# Patient Record
Sex: MALE | Race: WHITE | NOT HISPANIC OR LATINO | Employment: STUDENT | URBAN - METROPOLITAN AREA
[De-identification: names, ages, dates, MRNs, and addresses within clinical notes are randomized per-mention and may not be internally consistent; named-entity substitution may affect disease eponyms.]

---
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
## 2021-05-29 ENCOUNTER — OFFICE VISIT (OUTPATIENT)
Dept: URGENT CARE | Facility: URGENT CARE | Age: 21
End: 2021-05-29
Payer: COMMERCIAL

## 2021-05-29 VITALS
DIASTOLIC BLOOD PRESSURE: 69 MMHG | SYSTOLIC BLOOD PRESSURE: 129 MMHG | RESPIRATION RATE: 16 BRPM | HEART RATE: 74 BPM | TEMPERATURE: 97.7 F | OXYGEN SATURATION: 98 %

## 2021-05-29 DIAGNOSIS — H60.502 ACUTE OTITIS EXTERNA OF LEFT EAR, UNSPECIFIED TYPE: Primary | ICD-10-CM

## 2021-05-29 PROCEDURE — 99213 OFFICE O/P EST LOW 20 MIN: CPT | Performed by: PHYSICIAN ASSISTANT

## 2021-05-29 RX ORDER — NEOMYCIN SULFATE, POLYMYXIN B SULFATE, HYDROCORTISONE 3.5; 10000; 1 MG/ML; [USP'U]/ML; MG/ML
3 SOLUTION/ DROPS AURICULAR (OTIC) 4 TIMES DAILY
Qty: 5 ML | Refills: 0 | Status: SHIPPED | OUTPATIENT
Start: 2021-05-29 | End: 2021-06-05

## 2021-05-29 NOTE — PROGRESS NOTES
SUBJECTIVE:  Daniele Quinones is a 21 year old male who presents with left ear pain for 3 day(s).   Severity: moderate   Timing:gradual onset  Additional symptoms include none.      History of recurrent otitis: no    No past medical history on file.  Current Outpatient Medications   Medication Sig Dispense Refill     neomycin-polymyxin-hydrocortisone (CORTISPORIN) 3.5-12981-4 otic solution Place 3 drops Into the left ear 4 times daily for 7 days 5 mL 0     Social History     Tobacco Use     Smoking status: Never Smoker     Smokeless tobacco: Never Used   Substance Use Topics     Alcohol use: Not on file       ROS:   Review of systems negative except as stated above.    OBJECTIVE:  /69 (BP Location: Right arm, Cuff Size: Adult Regular)   Pulse 74   Temp 97.7  F (36.5  C) (Tympanic)   Resp 16   SpO2 98%    EXAM:  The right TM is normal: no effusions, no erythema, and normal landmarks     The right auditory canal is normal and without drainage, edema or erythema  The left TM is normal: no effusions, no erythema, and normal landmarks  The left auditory canal is swollen and tender  Oropharynx exam is normal: no lesions, erythema, adenopathy or exudate.  GENERAL: no acute distress  EYES: EOMI,  PERRL, conjunctiva clear  NECK: supple, non-tender to palpation, no adenopathy noted  RESP: lungs clear to auscultation - no rales, rhonchi or wheezes  CV: regular rates and rhythm, normal S1 S2, no murmur noted  SKIN: no suspicious lesions or rashes     ASSESSMENT:  (H60.502) Acute otitis externa of left ear, unspecified type  (primary encounter diagnosis)  Plan: neomycin-polymyxin-hydrocortisone (CORTISPORIN)        3.5-53577-0 otic solution      Patient Instructions     Patient Education     External Ear Infection (Adult)    External otitis (also called  swimmer s ear ) is an infection in the ear canal. It's often caused by bacteria or fungus. It can occur a few days after water gets trapped in the ear canal (from  swimming or bathing). It can also occur after cleaning too deeply in the ear canal with a cotton swab or other object. Sometimes, hair care products get into the ear canal and cause this problem.   Symptoms can include pain, fever, itching, redness, drainage, or swelling of the ear canal. Temporary hearing loss may also occur.   Home care    Don't try to clean the ear canal. This can push pus and bacteria deeper into the canal.    Use prescribed ear drops as directed. These help reduce swelling and fight the infection. If an ear wick was placed in the ear canal, apply drops right onto the end of the wick. The wick will draw the medicine into the ear canal even if it's swollen closed.    A cotton ball may be loosely placed in the outer ear to absorb any drainage.    You may use over-the-counter medicines to control pain as directed by the healthcare provider, unless another medicine was prescribed. Talk with your provider before using these medicines if you have chronic liver or kidney disease or ever had a stomach ulcer or digestive tract bleeding.    Don't allow water to get into your ear when bathing. Also don't swim until the infection has cleared.    Prevention    Keep your ears dry. This helps lower the risk of infection. Dry your ears with a towel or hair dryer after getting wet. Also, use ear plugs when swimming.    Don't stick any objects in the ear to remove wax.    Talk with your provider about using ear drops to prevent swimmer's ear in case you feel water trapped in your ear canal. You can get these drops over the counter at most drugstores. They work by removing water from the ear canal.    Follow-up care  Follow up with your healthcare provider in 1 week, or as advised.   When to seek medical advice  Call your healthcare provider right away if any of these occur:     Ear pain becomes worse or doesn t improve after 3 days of treatment    Redness or swelling of the outer ear occurs or gets  worse    Headache    Fever of 100.4 F (38 C) or higher, or as directed by your healthcare provider  Call 911  Call 911 or get immediate medical care if any of the following occur:     Seizure    Unusual drowsiness or confusion    Unusual painful or stiff neck    Beto last reviewed this educational content on 8/1/2020 2000-2021 [employer]. All rights reserved. This information is not intended as a substitute for professional medical care. Always follow your healthcare professional's instructions.

## 2021-05-29 NOTE — PATIENT INSTRUCTIONS
Patient Education     External Ear Infection (Adult)    External otitis (also called  swimmer s ear ) is an infection in the ear canal. It's often caused by bacteria or fungus. It can occur a few days after water gets trapped in the ear canal (from swimming or bathing). It can also occur after cleaning too deeply in the ear canal with a cotton swab or other object. Sometimes, hair care products get into the ear canal and cause this problem.   Symptoms can include pain, fever, itching, redness, drainage, or swelling of the ear canal. Temporary hearing loss may also occur.   Home care    Don't try to clean the ear canal. This can push pus and bacteria deeper into the canal.    Use prescribed ear drops as directed. These help reduce swelling and fight the infection. If an ear wick was placed in the ear canal, apply drops right onto the end of the wick. The wick will draw the medicine into the ear canal even if it's swollen closed.    A cotton ball may be loosely placed in the outer ear to absorb any drainage.    You may use over-the-counter medicines to control pain as directed by the healthcare provider, unless another medicine was prescribed. Talk with your provider before using these medicines if you have chronic liver or kidney disease or ever had a stomach ulcer or digestive tract bleeding.    Don't allow water to get into your ear when bathing. Also don't swim until the infection has cleared.    Prevention    Keep your ears dry. This helps lower the risk of infection. Dry your ears with a towel or hair dryer after getting wet. Also, use ear plugs when swimming.    Don't stick any objects in the ear to remove wax.    Talk with your provider about using ear drops to prevent swimmer's ear in case you feel water trapped in your ear canal. You can get these drops over the counter at most drugstores. They work by removing water from the ear canal.    Follow-up care  Follow up with your healthcare provider in 1 week,  or as advised.   When to seek medical advice  Call your healthcare provider right away if any of these occur:     Ear pain becomes worse or doesn t improve after 3 days of treatment    Redness or swelling of the outer ear occurs or gets worse    Headache    Fever of 100.4 F (38 C) or higher, or as directed by your healthcare provider  Call 911  Call 911 or get immediate medical care if any of the following occur:     Seizure    Unusual drowsiness or confusion    Unusual painful or stiff neck    Beto last reviewed this educational content on 8/1/2020 2000-2021 [employer]. All rights reserved. This information is not intended as a substitute for professional medical care. Always follow your healthcare professional's instructions.

## 2024-05-30 ENCOUNTER — HOSPITAL ENCOUNTER (EMERGENCY)
Facility: CLINIC | Age: 24
Discharge: HOME | End: 2024-05-30
Attending: EMERGENCY MEDICINE | Admitting: EMERGENCY MEDICINE
Payer: COMMERCIAL

## 2024-05-30 VITALS
HEIGHT: 68 IN | RESPIRATION RATE: 17 BRPM | HEART RATE: 54 BPM | BODY MASS INDEX: 25.73 KG/M2 | TEMPERATURE: 98.8 F | WEIGHT: 169.8 LBS | OXYGEN SATURATION: 98 % | SYSTOLIC BLOOD PRESSURE: 142 MMHG | DIASTOLIC BLOOD PRESSURE: 92 MMHG

## 2024-05-30 DIAGNOSIS — R45.851 SUICIDAL IDEATION: ICD-10-CM

## 2024-05-30 DIAGNOSIS — F33.1 MAJOR DEPRESSIVE DISORDER, RECURRENT EPISODE, MODERATE (H): Primary | ICD-10-CM

## 2024-05-30 PROBLEM — F43.21 ADJUSTMENT DISORDER WITH DEPRESSED MOOD: Status: ACTIVE | Noted: 2024-05-30

## 2024-05-30 PROCEDURE — 250N000013 HC RX MED GY IP 250 OP 250 PS 637: Performed by: NURSE PRACTITIONER

## 2024-05-30 PROCEDURE — 99213 OFFICE O/P EST LOW 20 MIN: CPT | Performed by: NURSE PRACTITIONER

## 2024-05-30 PROCEDURE — 99283 EMERGENCY DEPT VISIT LOW MDM: CPT

## 2024-05-30 RX ORDER — ESCITALOPRAM OXALATE 5 MG/1
5 TABLET ORAL DAILY
COMMUNITY
Start: 2024-05-16

## 2024-05-30 RX ORDER — ESCITALOPRAM OXALATE 5 MG/1
5 TABLET ORAL DAILY
Status: DISCONTINUED | OUTPATIENT
Start: 2024-05-30 | End: 2024-05-30 | Stop reason: HOSPADM

## 2024-05-30 RX ADMIN — ESCITALOPRAM OXALATE 5 MG: 5 TABLET, FILM COATED ORAL at 19:44

## 2024-05-30 ASSESSMENT — ACTIVITIES OF DAILY LIVING (ADL)
ADLS_ACUITY_SCORE: 35

## 2024-05-30 ASSESSMENT — COLUMBIA-SUICIDE SEVERITY RATING SCALE - C-SSRS
6. HAVE YOU EVER DONE ANYTHING, STARTED TO DO ANYTHING, OR PREPARED TO DO ANYTHING TO END YOUR LIFE?: NO
2. HAVE YOU ACTUALLY HAD ANY THOUGHTS OF KILLING YOURSELF IN THE PAST MONTH?: YES
1. IN THE PAST MONTH, HAVE YOU WISHED YOU WERE DEAD OR WISHED YOU COULD GO TO SLEEP AND NOT WAKE UP?: YES
3. HAVE YOU BEEN THINKING ABOUT HOW YOU MIGHT KILL YOURSELF?: NO
4. HAVE YOU HAD THESE THOUGHTS AND HAD SOME INTENTION OF ACTING ON THEM?: YES
5. HAVE YOU STARTED TO WORK OUT OR WORKED OUT THE DETAILS OF HOW TO KILL YOURSELF? DO YOU INTEND TO CARRY OUT THIS PLAN?: NO

## 2024-05-30 NOTE — PROGRESS NOTES
Batsheva is 24 year old male with a history of depression, anxiety, and ADHD. He was received from ED due to suicidal ideation. Reports indicate that he has been having suicidal ideation for the last 2 months since being fired from his job where he worked as . Patient reports SI with no plan. Pt stressors have been that he had interviewed for a new job after his third round of interview he got a letter of rejection, that triggered his SI. Pt mentioned that he has struggled with depression since when he was about 14 years old to 15 years old, but never sought treatment for it. He mentioned that he used to take concerta for ADHD but he has stopped the medication., he didn't like the side effect of it.    Nursing and risk assessments completed. Assessments reviewed with LMHP and physician. Admission information reviewed with patient. Patient given a tour of [facility] and instructions on using the facility. Questions regarding [facility] addressed. Pt safety search completed.

## 2024-05-30 NOTE — ED TRIAGE NOTES
Pt comes in for suicidal thoughts that have been going on for the last 2 months since being fired from his job. No plan. No suicidal history. Started lexapro 2 weeks ago. Has not been sleeping well. Pt is calm and cooperative.

## 2024-05-30 NOTE — ED PROVIDER NOTES
"    History     Chief Complaint:  Suicidal       HPI   Daniele Quinones is a 24 year old male presenting with his mother with suicidal ideation.  He reports he has been having mild suicidal thoughts for approximately 1 month, but they have significantly worsened starting today.  He states he was recently fired from his job, and he was interviewing for another one, and just found out that he did not get the new job today.  He has no active plan, but states he wants to die \"by my own hands.\"  He started Lexapro 2 weeks ago, but he otherwise takes no medications.  No alcohol use.  He endorses recreational marijuana use.      Independent Historian:    Patient only    Review of External Notes:  11/16/23: Clinic note reviewed      Medications:    No current outpatient medications on file.      Past Medical History:    No past medical history on file.    Past Surgical History:    No past surgical history on file.       Physical Exam   Patient Vitals for the past 24 hrs:   BP Temp Temp src Pulse Resp SpO2   05/30/24 1537 119/70 97.7  F (36.5  C) Temporal 56 16 97 %        Physical Exam  General: No acute distress.  Head: No obvious trauma to head.  Eyes:  Conjunctivae clear.   Neck: Normal range of motion.   CV: Skin is well perfused, no cyanosis  Respiratory: Effort normal. No audible wheezing.  Gastrointestinal: Non-distended.  Musculoskeletal: Normal range of motion. No gross deformities.  Neuro: Alert. Moving all extremities appropriately.  Normal speech.    Skin: No rashes or lesions on exposed skin.  Psych: Suicidal ideation      Emergency Department Course       Laboratory:  Labs Ordered and Resulted from Time of ED Arrival to Time of ED Departure - No data to display     Procedures   NA    Emergency Department Course & Assessments:    Interventions:  Medications - No data to display     Assessments:  1542 initial evaluation and assessment of patient    Independent Interpretation (X-rays, CTs, rhythm " strip):  None    Consultations/Discussion of Management or Tests:  None       Social Determinants of Health affecting care:  None     Disposition:  The patient was transferred to [facility]    Impression & Plan    CMS Diagnoses: None       Medical Decision Making:  Patient presents with his mother with suicidal ideation.  He has no specific plan.  He has not previously attempted suicide or self-harm.  He is agreeable to coming to the [facility] unit for help.  He remains calm and cooperative.  He is transported to the [facility] unit in stable condition.        Diagnosis:    ICD-10-CM    1. Suicidal ideation  R45.851            Discharge Medications:  New Prescriptions    No medications on file          5/30/2024   Brandon Ashley MD Peery, Stephen, MD  05/30/24 0692

## 2024-05-30 NOTE — CONSULTS
"Diagnostic Evaluation Consultation  Crisis Assessment    Patient Name: Daniele Quinones  Age:  24 year old  Legal Sex: male  Gender Identity: male  Pronouns:   Race: White  Ethnicity: Not  or   Language: English      Patient was assessed: In person      Patient location: [facility] EMERGENCY DEPT EMP 5                                 Referral Data and Chief Complaint  Daniele Quinones presents to the ED with family/friends. Patient is presenting to the ED for the following concerns: Suicidal ideation, Depression.   Factors that make the mental health crisis life threatening or complex are:  Patient presented to the emergency department with his mother, at the recommendation of this therapist, due to increased depression and suicidal ideation. After pt had a third and final interview with a prospective employer, pt received a rejection letter this morning. He called his mother, stating that he felt worthless and suicidal. He told his mother that he wanted to end his life by his \"own hands.\" He had a therapy appointment today, which his mother accompanied him to. His therapist recommended that pt be evaluated in the ED. At the time of assessment, pt presented as calm and cooperative. He denied having current suicidal ideation. He reported feeling suicidal earlier today with various methods including hanging. He denied making a plan and having intent to act on these thoughts..      Informed Consent and Assessment Methods  Explained the crisis assessment process, including applicable information disclosures and limits to confidentiality, assessed understanding of the process, and obtained consent to proceed with the assessment.  Assessment methods included conducting a formal interview with patient, review of medical records, collaboration with medical staff, and obtaining relevant collateral information from family and community providers when available.  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 He held that job for nine months even though he did not enjoy it. He was not in therapy during that time, but he has restarted weekly therapy since termination. Pt reported a history of ADHD since he was [age], which he was on Concerta for for about ten years. Pt reported struggling with depression and has had suicidal ideations since he was 13 or 14 years old. He stated that these thoughts are usually fleeting. He denied a history of suicide attempts and self-harm. Pt met with a new provider, a physician assistant, two weeks ago and was started on Lexapro. Pt stated that he was aware of the side effect of increased SI, but he would like to give it a change and to continue taking it.    Brief Psychosocial History  Family:   (Pt has a girlfriend, who is in [place] for college), Children no  Support System:  Partner, Parent(s)  Employment Status:  unemployed, employment seeking  Source of Income:  none, other (see comments) (supported by family)  Financial Environmental Concerns:  unemployed  Current Hobbies:  exercise/fitness  Barriers in Personal Life:  mental health concerns    Significant Clinical History  Current Anxiety Symptoms:  excessive worry (poor sleep)  Current Depression/Trauma:  excessive guilt, thoughts of death/suicide, low self esteem, impaired decision making, hopelessness, helplessness, sadness, crying or feels like crying, negativistic, difficulty concentrating, withdrawl/isolation, apathy  Current Somatic Symptoms:     Current Psychosis/Thought Disturbance:     Current Eating Symptoms:  loss of appetite  Chemical Use History:  Alcohol: None  Last Use:: 05/25/24  Benzodiazepines: None  Opiates: None  Cocaine: None  Marijuana: Daily  Last  Use:: 05/29/24  Other Use: None   Past diagnosis:  ADHD  Family history:  Depression (mother with depression)  Past treatment:     Details of most recent treatment:  Pt met with a new provider, a physician assistant, two weeks ago and was started on Lexapro. Pt has an established weekly OP therapist.  Other relevant history:          Collateral Information  Is there collateral information: Yes     Collateral information name, relationship, phone number:  Freida Stahl (Mother) [phone]    What happened today: Patient lives with dad, but called mom after he was denied a job offer. He expressed suicidal ideation, so mom rushed to be with pt. She went with him to the therapy appointment, but did not sit in with pt until the therapist called her in. She brought pt to the ED unit.     What is different about patient's functioning: Freida stated that pt chronically feels bad about himself and struggles with shame. She stated that how others see pt versus how he views himself is the complete opposite,       Has patient made comments about wanting to kill themselves/others: yes    If d/c is recommended, can they take part in safety/aftercare planning: yes    Additional collateral information:  Freida stated that pt quickly found a recommendation after his therapist recommended it. Pt saw a PA for the first time two weeks ago. Freida would like him to see a psychiatrist. 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     Risk Assessment  [place] Suicide Severity Rating Scale Full Clinical Version:  Suicidal Ideation  Q1 Wish to be Dead (Lifetime): Yes  Q2 Non-Specific Active Suicidal Thoughts (Lifetime): Yes  3. Active Suicidal Ideation with any Methods (Not Plan) Without Intent to Act (Lifetime): Yes  Q4 Active Suicidal Ideation with Some Intent to Act, Without Specific Plan (Lifetime): No  Q5 Active Suicidal Ideation with Specific Plan and Intent (Lifetime): No  Q6  Suicide Behavior (Lifetime): no     Suicidal Behavior (Lifetime)  Actual Attempt (Lifetime): No  Has subject engaged in non-suicidal self-injurious behavior? (Lifetime): No  Interrupted Attempts (Lifetime): No  Aborted or Self-Interrupted Attempt (Lifetime): No  Preparatory Acts or Behavior (Lifetime): No    [place] Suicide Severity Rating Scale Recent:   Suicidal Ideation (Recent)  Q1 Wished to be Dead (Past Month): yes  Q2 Suicidal Thoughts (Past Month): yes  Q3 Suicidal Thought Method: yes  Q4 Suicidal Intent without Specific Plan: no  Q5 Suicide Intent with Specific Plan: no  Level of Risk per Screen: moderate risk  Intensity of Ideation (Recent)  Most Severe Ideation Rating (Past 1 Month): 3  Description of Most Severe Ideation (Past 1 Month): Pt reported feeling suicidal earlier today with various methods including hanging. He denied making a plan and having intent to act on these thoughts.  Frequency (Past 1 Month): Less than once a week  Duration (Past 1 Month): Fleeting, few seconds or minutes  Controllability (Past 1 Month): Can control thoughts with little difficulty  Deterrents (Past 1 Month): Deterrents definitely stopped you from attempting suicide  Reasons for Ideation (Past 1 Month): Mostly to end or stop the pain (You couldn't go on living with the pain or how you were feeling)  Suicidal Behavior (Recent)  Actual Attempt (Past 3 Months): No  Has subject engaged in non-suicidal self-injurious behavior? (Past 3 Months): No  Interrupted Attempts (Past 3 Months): No  Aborted or Self-Interrupted Attempt (Past 3 Months): No  Preparatory Acts or Behavior (Past 3 Months): No    Environmental or Psychosocial Events: neither working nor attending school, work or task failure, geographic isolation from supports, unemployment/underemployment, recent life events (see comment) (job rejection today)  Protective Factors: Protective Factors: strong bond to family unit, community support, or employment,  responsibilities and duties to others, including pets and children, lives in a responsibly safe and stable environment, good treatment engagement, sense of importance of health and wellness, able to access care without barriers, good impulse control, help seeking, supportive ongoing medical and mental health care relationships, reality testing ability, sense of self-efficacy and/or positive self-esteem, optimistic outlook - identification of future goals    Does the patient have thoughts of harming others? Feels Like Hurting Others: no  Previous Attempt to Hurt Others: no  Is the patient engaging in sexually inappropriate behavior?: no    Is the patient engaging in sexually inappropriate behavior?  no        Mental Status Exam   Affect: Appropriate  Appearance: Appropriate  Attention Span/Concentration: Attentive  Eye Contact: Engaged    Fund of Knowledge: Appropriate   Language /Speech Content: Fluent  Language /Speech Volume: Normal  Language /Speech Rate/Productions: Normal  Recent Memory: Intact  Remote Memory: Intact  Mood: Normal, Sad  Orientation to Person: Yes   Orientation to Place: Yes  Orientation to Time of Day: Yes  Orientation to Date: Yes     Situation (Do they understand why they are here?): Yes  Psychomotor Behavior: Normal  Thought Content: Clear  Thought Form: Intact       Medication  Psychotropic medications:   Medication Orders - Psychiatric (From admission, onward)      Start     Dose/Rate Route Frequency Ordered Stop    05/30/24 1932  escitalopram (LEXAPRO) tablet 5 mg         5 mg Oral DAILY 05/30/24 1932               Current Care Team  Patient Care Team:  No Ref-Primary, Physician as PCP - Micaela Major, RN as Nurse Coordinator (Developmental[facility]Behavioral Pediatrics)    Diagnosis  Patient Active Problem List   Diagnosis Code    Attention deficit hyperactivity disorder (ADHD) F90.9    Adjustment disorder with depressed mood F43.21       Primary Problem This Admission  Active  Hospital Problems    *Adjustment disorder with depressed mood F43.21      Clinical Summary and Substantiation of Recommendations   Patient presented to the emergency department with his mother, at the recommendation of this therapist, due to increased depression and suicidal ideation. After pt had a third and final interview with a prospective employer, pt received a rejection letter this morning. Pt called his mother expressing shame, guilt, worthlessness, and expressed suicidal ideation to her. At the time of assessment, pt reported feeling suicidal earlier today with various methods including hanging. He denied making a plan and having intent to act on these thoughts. He denied current suicidal ideation. He was cooperative with assessment and responded well to cognitive behavioral techniques such as role playing and cognitive restructuring/reframing. After a period of observation at [facility], pt reported a decrease in depressive and anxiety symptoms.                 Pt met with a new provider, a physician assistant, two weeks ago and was started on Lexapro. Pt stated that he was aware of the side effect of increased SI, but he would like to give it a change and to continue taking it. He plans to call the clinic for a follow up appointment. Pt plans to follow up with his established outpatient therapist. Pt was interested in programmatic care including IOP/PHP, so a [facility] diagnostic assessment was scheduled for 6/7/24 on his behalf.          Patient coping skills attempted to reduce the crisis:  Talking to mom and therapist    Disposition  Recommended disposition: Individual Therapy, Programmatic Care, Medication Management        Reviewed case and recommendations with attending provider. Attending Name: Taisha Edouard       Attending concurs with disposition: yes       Patient and/or validated legal guardian concurs with disposition: yes       Final disposition:  discharge    Legal status on admission:  Voluntary/Patient has signed consent for treatment    Assessment Details   Total duration spent with the patient: 60 min     CPT code(s) utilized: 64529 - Psychotherapy for Crisis - 60 (30-74*) min    DENILSON Martino, Psychotherapist  DEC - Triage & Transition Services  Callback: [phone]

## 2024-05-30 NOTE — ED NOTES
[facility]  ED to EMPATH Checklist:      Goal for EMPATH: Suicidality    Current Behavior: Cooperative    Safety Concerns: Suicidal, no plan    Legal Hold Status: Voluntary    Medically Cleared by ED provider: Yes    Patient Therapeutically Searched: Not searched - Currently in triage    Belongings: Remain with patient    Independent Ambulation at Baseline: Yes/No: Yes    Participates in Care/Conversation: Yes/No: Yes    Patient Informed about EMPATH: Yes/No: Yes    DEC: Not ordered    Patient Ready to be Transferred to EMPATH? Yes/No: Yes

## 2024-05-31 NOTE — DISCHARGE INSTRUCTIONS
Aftercare Plan        Follow up with established providers and supports as scheduled. Continue taking medications as prescribed. Abstain from drugs and alcohol. Utilize your [facility] mental [facility] crisis team as needed. They are available 24/7. Contact information is listed below.     The following appointment(s) and/or referral(s) were made on your behalf. If you need to make changes or cancel please contact the clinic/provider directly.     What: Diagnostic Assessment for Programmatic Care  When: June 7th 7:30 am  Who: Quinn PEÑA  Where: [facility]  [address] [address]  Contact: [phone]         If I am feeling unsafe or I am in a crisis, I will:   Contact my established care providers   Call the National Suicide Prevention Lifeline: 194.755.4248   Go to the nearest emergency room   Call 141     Warning signs that I or other people might notice when a crisis is developing for me: changes to sleep, appetite or mood, increased anger, agitation or irritability, feeling depressed or hopeless, spending more time alone or talking less, increased crying, decreased productivity, seeing or hearing things that aren't there, thoughts of not wanting to live anymore or of actually killing myself, thoughts of hurting others    Things I am able to do on my own to cope or help me feel better: watching a favorite tv show or movie, listening to music I enjoy, going outside and breathing fresh air, going for a walk or exercising, taking a shower or bath, a cold or hot beverage, a healthy snack, drawing/coloring/painting, journaling, singing or dancing, deep breathing     I can try practicing square breathing when I begin to feel anxious - inhale through the nose for the count of 4 and the first line on the square. Exhale through the mouth for the count of 4 for the second line of the square. Repeat to complete the square. Repeat the square as many times as  needed.    I can also use my five senses to practice mindfulness and grounding. What are five things I can see, four things I can hear, three things I can feel, two things I can smell, and one thing I can taste.     Things that I am able to do with others to cope or help me feel better: sometimes just talking or spending time with someone else, sharing a meal or having coffee, watching a movie or playing a game, going for a walk or exercising    I can also use community resources including mental health hotlines, [facility] crisis teams, or apps.     Things I can use or do for distraction: movies/tv, music, reading, games, drawing/coloring/painting or other art, essential oils, exercise, cleaning/organizing, puzzles, crossword puzzles, word search, Sudoku       I can also download a meditation or relaxation fco, like Calm, Headspace, or Insight Timer (all three offer a free version)    Changes I can make to support my mental health and wellness: Attend scheduled mental health therapy and psychiatric appointments. Take my medications as prescribed. Maintain a daily schedule/routine. Abstain from all mood altering substances, including drugs, alcohol, or medications not currently prescribed to me. Implement a self-care routine.      People in my life that I can ask for help: friends or family, trusted teachers/staff/colleagues, trusted members of my community or place of [employer], mental health crisis lines, or 911    Your [facility] has a mental health crisis team you can call 24/7: [facility] Adult, [phone]    Other things that are important when I m in crisis: to remember that the feelings I am having right now are temporary, and it won't feel like this forever, and that it is okay and important to ask for help    Crisis Lines  Crisis Text Line  Text 440990  You will be connected with a trained live crisis counselor to provide support.      National Hope Line  1.800.SUICIDE [7084928]      Community  "Resources  Fast Tracker  Linking people to mental health and substance use disorder resources  fasttrackermn.org     [place] Mental Health Warm Line  Peer to peer support  Monday thru Saturday, 12 pm to 10 pm  497.843.7576 or 9.010.835.9680  Text \"Support\" to 02945    National [place] on Mental Illness (ELIZ)  721.812.8158 or 1.888.ELIZ.HELPS      Mental Health Apps  My3  https://Otogami.org/    VirtualHopeBox  https://[employer]/apps/virtual-hope-box/      Additional Information  Today you were seen by a licensed mental health professional through Triage and Transition services, [facility] ([facility])  for a crisis assessment in the Emergency Department at [facility].  It is recommended that you follow up with your established providers (psychiatrist, mental health therapist, and/or primary care doctor - as relevant) as soon as possible. Coordinators from [facility] will be calling you in the next 24-48 hours to ensure that you have the resources you need.  You can also contact [facility] coordinators directly at [phone]. You may have been scheduled for or offered an appointment with a mental health provider. [facility] maintains an extensive network of licensed behavioral health providers to connect patients with the services they need.  We do not charge providers a fee to participate in our referral network.  We match patients with providers based on a patient's specific needs, insurance coverage, and location.  Our first effort will be to refer you to a provider within your care system, and will utilize providers outside your care system as needed.    "

## 2024-05-31 NOTE — PROGRESS NOTES
Discharge instructions reviewed with patient including follow-up care plan. Educated on medication regimen and advised not to stop prescribed medication without consulting their physician. Reviewed safety plan and outpatient resources. Pt denies active SI. Pt has contracted for safety and completed safety plan with [facility].  All belongings which were brought into the hospital have been returned to patient. Escorted off the unit at door4 accompanied by staff RN.  Discharged to home in stable condition via mum's car.

## 2024-06-07 ENCOUNTER — TELEPHONE (OUTPATIENT)
Dept: BEHAVIORAL HEALTH | Facility: CLINIC | Age: 24
End: 2024-06-07
Payer: COMMERCIAL

## 2024-06-07 ENCOUNTER — HOSPITAL ENCOUNTER (OUTPATIENT)
Dept: BEHAVIORAL HEALTH | Facility: CLINIC | Age: 24
Discharge: HOME | End: 2024-06-07
Attending: EMERGENCY MEDICINE | Admitting: EMERGENCY MEDICINE
Payer: COMMERCIAL

## 2024-06-07 DIAGNOSIS — F43.23 ADJUSTMENT DISORDER WITH MIXED ANXIETY AND DEPRESSED MOOD: Primary | ICD-10-CM

## 2024-06-07 PROCEDURE — 90791 PSYCH DIAGNOSTIC EVALUATION: CPT | Performed by: COUNSELOR

## 2024-06-07 RX ORDER — ESCITALOPRAM OXALATE 10 MG/1
10 TABLET ORAL DAILY
COMMUNITY

## 2024-06-07 ASSESSMENT — PATIENT HEALTH QUESTIONNAIRE - PHQ9: SUM OF ALL RESPONSES TO PHQ QUESTIONS 1-9: 10

## 2024-06-07 ASSESSMENT — ANXIETY QUESTIONNAIRES
GAD7 TOTAL SCORE: 6
IF YOU CHECKED OFF ANY PROBLEMS ON THIS QUESTIONNAIRE, HOW DIFFICULT HAVE THESE PROBLEMS MADE IT FOR YOU TO DO YOUR WORK, TAKE CARE OF THINGS AT HOME, OR GET ALONG WITH OTHER PEOPLE: NOT DIFFICULT AT ALL
4. TROUBLE RELAXING: SEVERAL DAYS
3. WORRYING TOO MUCH ABOUT DIFFERENT THINGS: SEVERAL DAYS
6. BECOMING EASILY ANNOYED OR IRRITABLE: MORE THAN HALF THE DAYS
2. NOT BEING ABLE TO STOP OR CONTROL WORRYING: NOT AT ALL
1. FEELING NERVOUS, ANXIOUS, OR ON EDGE: MORE THAN HALF THE DAYS
GAD7 TOTAL SCORE: 6
5. BEING SO RESTLESS THAT IT IS HARD TO SIT STILL: NOT AT ALL
7. FEELING AFRAID AS IF SOMETHING AWFUL MIGHT HAPPEN: NOT AT ALL

## 2024-06-07 ASSESSMENT — COLUMBIA-SUICIDE SEVERITY RATING SCALE - C-SSRS
4. HAVE YOU HAD THESE THOUGHTS AND HAD SOME INTENTION OF ACTING ON THEM?: NO
6. HAVE YOU EVER DONE ANYTHING, STARTED TO DO ANYTHING, OR PREPARED TO DO ANYTHING TO END YOUR LIFE?: NO
3. HAVE YOU BEEN THINKING ABOUT HOW YOU MIGHT KILL YOURSELF?: YES
1. IN THE PAST MONTH, HAVE YOU WISHED YOU WERE DEAD OR WISHED YOU COULD GO TO SLEEP AND NOT WAKE UP?: YES
2. HAVE YOU ACTUALLY HAD ANY THOUGHTS OF KILLING YOURSELF IN THE PAST MONTH?: YES
5. HAVE YOU STARTED TO WORK OUT OR WORKED OUT THE DETAILS OF HOW TO KILL YOURSELF? DO YOU INTEND TO CARRY OUT THIS PLAN?: NO

## 2024-06-07 ASSESSMENT — PAIN SCALES - GENERAL: PAINLEVEL: NO PAIN (0)

## 2024-06-07 NOTE — TELEPHONE ENCOUNTER
Summary of Patient Care Communication Handoff to Patient Navigator Coordinator    PATIENT'S NAME: Daniele Quinones  MRN:   [id number]  :   2000    DATE OF SERVICE: 24    Referral Needed: Yes    Is the patient coming from an inpatient unit? No    What program is this referral for? Adult Mental Health Referral    Level of Care Recommended:  Neuro-Psych Testing  and Combined Intensive Outpatient Day Treatment Program (IOP-ADT) / Adult Day Treatment Program (ADT)    Specialty Track Recommendations:  MH Specialty Tracks: Young Adult     Schedule Preferences: Schedule Preference:  No schedule preference (Mornings or Afternoons)     Are there any potential barriers for entrance into programmatic care? NA     Followed up from  Needed?:  Yes: 9035 for ADHD testing     Mental Health Referral Needed: No     Release of Information Needed:  MELITA Cunha Needed: No  Follow up Requests:  Patient Navigator Coordinator Follow-Up Needed: Patient is undecided, Patient Navigator Coordinator [phone] was provided to the patient to call when decided. and Specialty Care Coordinator referral needed for:  9035 for ADHD testing     Comments: MELITA Kelley, [facility], [facility]        Patient Navigator Coordinator Contact Information  Pool Message: [email or web address] ([phone])   Phone:  [phone]  Fax:  [phone]  Email:  [email or web address]

## 2024-06-07 NOTE — PROGRESS NOTES
"Outpatient Mental Health Services - Adult    MY COPING PLAN FOR SAFETY    PATIENT'S NAME: Daniele Quinones  MRN:   [id number]    SAFETY PLAN:    Step 1: Warning signs / cues (Thoughts, images, mood, situation, behavior) that a crisis may be developing:    Thoughts: \"I don't matter\", \"People would be better off without me\", \"I'm a burden\", \"I can't do this anymore\", \"I just want this to end\", and \"Nothing makes it better\"  Images: obsessive thoughts of death or dying: suicidal ideations  Thinking Processes: ruminations (can't stop thinking about my problems): of the past  and highly critical and negative thoughts: of self  Mood: worsening depression, hopelessness, helplessness, and intense worry  Behaviors: isolating/withdrawing , using drugs, can't stop crying, not taking care of my responsibilities, and not sleeping enough  Situations: public shame: due to social anxiety and changes in symptoms: depression and anxiety      Step 2: Coping strategies - Things I can do to take my mind off of my problems without contacting another person (relaxation technique, physical activity):    Distress Tolerance Strategies:  listen to positive and upbeat music:  , watch a funny movie:  , paced breathing/progressive muscle relaxation, and intense exercise:   for 2-3 minutes   Physical Activities: go for a walk, exercise:  , meditation, and deep breathing  Focus on helpful thoughts:  \"This is temporary\", \"I will get through this\", \"It always passes\", and \"Ride the wave\"    Step 3: People and social settings that provide distraction:     Name: parents, partner, and friends  Phone: NA   Name: MELITA Phone: NA   community center and gym      Step 4: Remind myself of people and things that are important to me and worth living for:  \"being a son and being a partner, exercise/fitness, pets and outdoors\"    Step 5: When I am in crisis, I can ask these people to help me use my safety plan:     Name: parents and partner Phone: NA   Name: " NA Phone: NA    Step 6: Making the environment safe:     be around others    Step 7: Professionals or agencies I can contact during a crisis:    Suicide Prevention Lifeline: 1-060-730-CMZE (9645)  Crisis Text Line Service (available 24 hours a day, 7 days a week): Text [place] to 697956  Call  **CRISIS (879922) from a cell phone to talk to a team of professionals who can help you.    Crisis Services By [facility]: Phone Number:   [place]     [phone]   [place]    [phone]   [place]    [phone]   [place]    [phone]   [place]    [phone]   [place] [phone]   [place]     [phone]     Call 911 or go to my nearest emergency department.     I helped develop this safety plan and agree to use it when needed.  I have been given a copy of this plan.      Client signature _________________________________________________________________  Today s date:  6/7/2024  Adapted from Safety Plan Template 2008 Rosalina Serna and Zbigniew Barber is reprinted with the express permission of the authors.  No portion of the Safety Plan Template may be reproduced without the express, written permission.  You can contact the authors at [email or web address] or [email or web address]

## 2024-06-07 NOTE — PROGRESS NOTES
LOCUS Worksheet     Name: Daniele Quinones MRN: [id number]    : 2000      Gender:  female    PMI:  NA   Provider Name: [facility]   Provider NPI:  [id number]    Actual level of Care Provided:  therapy and psychiatry    Service(s) receiving or referred to:  [facility]    Reason for Variance: due to worsening mental health symptoms and suicidal ideations.      Rating completed by: Quinn Kelley [email or web address]      I. Risk of Harm:   3      Moderate Risk of Harm    II. Functional Status:   2      Mild Impairment    III. Co-Morbidity:   2      Minor Co-Morbidity    IV - A. Recovery Environment - Level of Stress:   3      Moderately Stress Environment    IV - B. Recovery Environment - Level of Support:   3      Limited Support in Environment    V. Treatment and Recovery History:   3      Moderate to Equivocal Response to Treatment and Recovery Management    VI. Engagement and Recovery Project:   2      Positive Engagement and Recovery       18 Composite Score    Level of Care Recommendation:   17 to 19       High Intensity Community Based Services

## 2024-06-07 NOTE — PROGRESS NOTES
"    [facility]        PATIENT'S NAME: Daniele Quinones  PREFERRED NAME: Daniele  PRONOUNS:       MRN: [id number]  : 2000  ADDRESS: [address] [place] [address]  ACCT. NUMBER:  [id number]  DATE OF SERVICE: 24  START TIME: 7:56 AM  END TIME: 9:32 AM  PREFERRED PHONE: [phone]  May we leave a program related message: Yes  EMERGENCY CONTACT: was obtained for Freida Stahl (Mother) [phone].  SERVICE MODALITY:  In-person    [facility] ADULT Mental Health DIAGNOSTIC ASSESSMENT    Identifying Information:  Patient is a 24 year old,    individual.  Patient was referred for an assessment by [facility] EMERGENCY DEPT EMP 5 .  Patient attended the session alone.    Chief Complaint:   The reason for seeking services at this time is: \" Suicidal ideation, Depression.  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 He was not in therapy during that time, but he has restarted weekly therapy since termination. Pt reported a history of ADHD since he was [age], which he was on Concerta for about ten years. Pt reported struggling with depression and has had suicidal ideations since he was 13 or 14 years ago. He denied a history of suicide attempts and self-harm. Pt chronically feels bad about himself and struggles with shame. She stated that how others see pt versus how he views himself is the complete opposite. He reported feeling suicidal earlier today with various methods including hanging. He denied making a plan and having intent to act on these thoughts. 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 \"   The problem(s) began a decade ago, just recently taken steps to address his mental health. Patient has attempted to " "resolve these concerns in the past through therapy and psychiatry .       Social/Family History:  Patient reported that he grew up in [place], [place].  He was raised by biological parents.  Parents  16 years ago when the patient was 8 years old. The patient mother did remarry few years ago The patient's father did not remarry and remains single.   Patient reported that his childhood was really good, supportive and loving.  Patient described his current relationships with family of origin as really good, super supportive, open communication with him.      The patient describes his cultural background as .  Cultural influences and impact on patient's life structure, values, norms, and healthcare:  none identified .  Contextual influences on patient's health include: Contextual Factors: Individual Factors MI and Family Factors MI .  Cultural, Contextual, and socioeconomic factors do not affect the patient's access to services.  These factors will be addressed in the Preliminary Treatment plan.  Patient identified their preferred language to be English. Patient reported that he does not  need the assistance of an  or other support involved in therapy.     Patient reported had no significant delays in developmental tasks.   Patient's highest education level was college graduate. Patient identified the following learning problems:  ADHD . Pt reported a history of ADHD since he was [age], which he was on Concerta for about ten years.  Modifications will be used to assist communication in therapy.  Patient reports that he is able to understand written materials.    Patient reported the following relationship history \"NA\".  Patient's current relationship status is in a relationship  for almost 7 years. Pt has a girlfriend, who is in [place] for college.  Patient identified his sexual orientation as heterosexual.  Patient reported having zero child(vesna). Patient identified partner and parents as part " of his support system.  Patient identified the quality of these relationships as stable and meaningful.     Patient's current living/housing situation involves staying in own home/apartment. Patient lives with his father and wife and they report that housing is stable.     Patient is currently unemployed seeking for employment.  Patient reports their finances are obtained through parents.  Patient does not identify finances as a current stressor.      Patient reported that he has not been involved with the legal system.   Patient denies being on probation / parole / under the jurisdiction of the court.    Patient's Strengths and Limitations:  Patient identified the following strengths or resources that will help them succeed in treatment: commitment to health and well being, community involvement, exercise routine, friends / good social support, family support, insight, intelligence, and motivation. Things that may interfere with the patient's success in treatment include: none identified.     Assessments:  The following assessments were completed by patient for this visit:  PHQ9:       6/7/2024     8:00 AM   PHQ-9 SCORE   PHQ-9 Total Score 10     GAD7:       6/7/2024     8:00 AM   ART-7 SCORE   Total Score 6     CAGE-AID:       6/7/2024     8:00 AM   CAGE-AID Total Score   Total Score 0     PROMIS 10-Global Health (all questions and answers displayed):       6/7/2024     8:00 AM   PROMIS 10   In general, would you say your health is: 5   In general, would you say your quality of life is: 4   In general, how would you rate your physical health? 5   In general, how would you rate your mental health, including your mood and your ability to think? 1   In general, how would you rate your satisfaction with your social activities and relationships? 2   In general, please rate how well you carry out your usual social activities and roles. (This includes activities at home, at work and in your community, and  responsibilities as a parent, child, spouse, employee, friend, etc.) 4   To what extent are you able to carry out your everyday physical activities such as walking, climbing stairs, carrying groceries, or moving a chair? 5   In the past 7 days, how often have you been bothered by emotional problems such as feeling anxious, depressed, or irritable? 4   In the past 7 days, how would you rate your fatigue on average? 3   In the past 7 days, how would you rate your pain on average, where 0 means no pain, and 10 means worst imaginable pain? 4   Global Mental Health Score 9   Global Physical Health Score 16   PROMIS TOTAL - SUBSCORES 25     [place] Suicide Severity Rating Scale (Short Version)      5/30/2024     3:33 PM 5/30/2024     4:42 PM 5/30/2024     9:24 PM 5/30/2024     9:25 PM 6/7/2024     8:00 AM   [place] Suicide Severity Rating (Short Version)   Q1 Wished to be Dead (Past Month) 1-->yes 1-->yes  1-->yes 1-->yes   Q2 Suicidal Thoughts (Past Month) 1-->yes 1-->yes  1-->yes 1-->yes   Q3 Suicidal Thought Method 0-->no 0-->no  1-->yes 1-->yes   Q4 Suicidal Intent without Specific Plan 1-->yes 1-->yes  0-->no 0-->no   Q5 Suicide Intent with Specific Plan 0-->no 0-->no  0-->no 0-->no   Q6 Suicide Behavior (Lifetime) 0-->no 0-->no 0-->no  0-->no   Level of Risk per Screen high risk high risk  moderate risk moderate risk       Personal and Family Medical History:  Patient does not report a family history of mental health concerns.  Patient reports family history is not on file..     Patient does report Mental Health Diagnosis and/or Treatment.  Patient reported the following previous diagnoses which include(s): ADHD.  Patient reported symptoms began at the age of 5.   Patient has received mental health services in the past:  therapy and psychiatry .  Psychiatric Hospitalizations: None.  Patient denies a history of civil commitment.  Patient is receiving other mental health services.   Pt met with a new provider, jamil  physician assistant, two weeks ago and was started on Lexapro and had follow appointment two days ago for medications evaluation. Pt has an established weekly OP therapist.       Patient has not had a physical exam to rule out medical causes for current symptoms.  Date of last physical exam was greater than a year ago and client was encouraged to schedule an exam with PCP. The patient does not have a Primary Care Provider and was encouraged to establish care with a PCP..  Patient reports no current medical and/or dental concerns.  Patient denies any issues with pain.   There are significant appetite / nutritional concerns / weight changes. These may include: low appetite. Patient reports the following sleep concerns:  No concerns.   Patient does not report a history of head injury / trauma / cognitive impairment.    Patient reports current meds as:   Current Outpatient Medications   Medication Sig Dispense Refill    escitalopram (LEXAPRO) 10 MG tablet Take 10 mg by mouth daily      escitalopram (LEXAPRO) 5 MG tablet Take 5 mg by mouth daily 1 tablet Orally Once a day for 30 days (Patient not taking: Reported on 6/7/2024)       No current facility-administered medications for this encounter.       Medication Adherence:  Patient reports taking prescribed medications as prescribed.    Patient Allergies:  No Known Allergies    Medical History:  No past medical history on file.      Current Mental Status Exam:   Appearance:  Appropriate    Eye Contact:  Good   Psychomotor:  Normal       Gait / station:  no problem  Attitude / Demeanor: Cooperative  Interested  Speech      Rate / Production: Normal/ Responsive      Volume:  Normal  volume      Language:  intact  Mood:   Normal  Affect:   Appropriate    Thought Content: Clear   Thought Process: Coherent  Goal Directed       Associations: No loosening of associations  Insight:   Fair   Judgment:  Intact   Orientation:  All  Attention/concentration: Fair    Substance Use:    Patient did not report a family history of substance use concerns; see medical history section for details.  Patient has not received chemical dependency treatment in the past.  Patient has not ever been to detox.      Patient is not currently receiving any chemical dependency treatment. Patient reported the following problems as a result of his substance use:   none .    Patient reports having a drunk or tow here and there.  Patient denies using tobacco.  Patient reports using cannabis 1 times per day and smokes 1 at a time. Patient started using cannabis at age 19.  Patient reports last use was 05/29/24.  Patient reports heaviest use is current use.   Patient denies using caffeine.  Patient reports using/abusing the following substance(s). Patient reported no other substance use.     Substance Use: daily use    Based on the negative CAGE score and clinical interview there  are indications of drug or alcohol abuse. Recommendation for substance abuse disorder evaluation with a substance use professional was given. Therapist did not recommend client to reduce use or abstain from alcohol or substance use. Therapist did not recommend structured treatment and or community support (AA, 12 step group, etc.). NA .    Significant Losses / Trauma / Abuse / Neglect Issues:   Patient did not serve in the .  There are indications or report of significant loss, trauma, abuse or neglect issues related to: are no indications and client denies any losses, trauma, abuse, or neglect concerns.  Concerns for possible neglect are not present.     Safety Assessment:   Patient denies current homicidal ideation and behaviors.  Patient denies current self-injurious ideation and behaviors.    Patient denied risk behaviors associated with substance use.   Patient denies any high risk behaviors associated with mental health symptoms.  Patient reports the following current concerns for his personal safety: None.  Patient reports there    firearms in the house.       There are no firearms in the home..    History of Safety Concerns:  Patient denied a history of homicidal ideation.     Patient denied a history of personal safety concerns.    Patient denied a history of assaultive behaviors.    Patient denied a history of sexual assault behaviors.     Patient denied a history of risk behaviors associated with substance use.  Patient reported a history of substance use associated with mental health symptoms.  Patient reports the following protective factors:  strong bond to family unit, community support, or employment, responsibilities and duties to others, including pets and children, lives in a responsibly safe and stable environment, good treatment engagement, sense of importance of health and wellness, able to access care without barriers, good impulse control, help seeking, supportive ongoing medical and mental health care relationships, reality testing ability, sense of self-efficacy and/or positive self-esteem, optimistic outlook - identification of future goals    Risk Plan:  See Recommendations for Safety and Risk Management Plan    Review of Symptoms per patient report:   Depression: Change in sleep, Lack of interest, Excessive or inappropriate guilt, Change in energy level, Difficulties concentrating, Change in appetite, Suicidal ideation, Feelings of hopelessness, Feelings of helplessness, Low self-worth, Ruminations, Feeling sad, down, or depressed, and Frequent crying, excessive guilt, thoughts of death/suicide, low self-esteem, impaired decision making, sadness, crying or feels like crying, negativistic, withdrawal/isolation, apathy  Ana María:  No Symptoms  Psychosis: No Symptoms  Anxiety: Excessive worry, Nervousness, Physical complaints, such as headaches, stomachaches, muscle tension, Social anxiety, Fears/phobias being judged by other and chronically feels bad about himself and struggles with shame, Sleep disturbance, Ruminations,  Poor concentration, and Irritability  Panic:  Numbness  Post Traumatic Stress Disorder:  No Symptoms   Eating Disorder: No Symptoms  ADD / ADHD:  Inattentive, Difficulties listening, Poor task completion, Poor organizational skills, Distractibility, and Forgetful  Conduct Disorder: No symptoms  Autism Spectrum Disorder: No symptoms  Obsessive Compulsive Disorder: No Symptoms    Patient reports the following compulsive behaviors and treatment history:  none reported .      Diagnostic Criteria:   Attention Deficit Hyperactivity Disorder  A) A persistent pattern of inattention and/or hyperactivity-impulsivity that interferes with functioning or development, as characterized by (1) Inattention and/or (2) Hyperactivity and Impulsivity  (1) Inattention: 6 or more of the following symptoms have persisted for at least 6 months to a degree that is inconsistent with developmental level and that negatively impacts directly on social and academic/occupational activities:  - Often fails to give close attention to details or makes careless mistakes in schoolwork, at work, or during other activities  - Often has difficulty sustaining attention in tasks or play activities  - Often does not seem to listen when spoken to directly  - Often does not follow through on instructions and fails to finish schoolwork, chores, or duties in the workplace  - Often has difficulty organizing tasks and activities  - Often avoids, dislikes, or is reluctant to engage in tasks that require sustained mental effort  - Often loses things necessary for tasks or activities  - Is often easily distractedby extraneous stimuli  - Is often forgetful in daily activities  B) Several inattentive or hyperactive-impulsive symptoms were present prior to age 12 years  C) Several inattentive or hyperactive-impulsive symptoms are present in two or more settings  D) There is clear evidence that the symptoms interfere with, or reduce the quality of, social academic, or  occupational functioning  E) The Symptoms do not occur exclusively during the course of schizophrenia or another psychotic disorder and are not better explained by another mental disorder Adjustment Disorder  A. The development of emotional or behavioral symptoms in response to an identifiable stressor(s) occurring within 3 months of the onset of the stressor(s)  B. These symptoms or behaviors are clinically significant, as evidenced by one or both of the following:       - Marked distress that is out of proportion to the severity/intensity of the stressor (with consideration for external context & culture)       - Significant impairment in social, occupational, or other important areas of functioning  C. The stress-related disturbance does not meet criteria for another disorder & is not not an exacerbation of another mental disorder  D. The symptoms do not represent normal bereavement  E. Once the stressor or its consequences have terminated, the symptoms do not persist for more than an additional 6 months       * Adjustment Disorder with Mixed Anxiety and Depressed Mood: The predominant manifestation is a combination of depression and anxiety    Functional Status:  Patient reports the following functional impairments:  self-care, social interactions, and work / vocational responsibilities.     Programmatic care:  Current LOCUS was assigned and patient needs the following level of care based on score 18  .    Clinical Summary:  1. Psychosocial, Cultural and Contextual Factors: neither working nor attending school, work or task failure, geographic isolation from supports, unemployment/underemployment, recent life events (see comment) (job rejection today)  .  2. Principal DSM5 Diagnoses  (Sustained by DSM5 Criteria Listed Above):   Adjustment Disorders  309.28 (F43.23) With mixed anxiety and depressed mood.  3. Other Diagnoses that is relevant to services:   Attention-Deficit/Hyperactivity Disorder  314.00 (F90.0)  Predominantly inattentive presentation.  4. Provisional Diagnosis:  300.23 (F40.10) Social Anxiety Disorder.  5. Prognosis: Expect Improvement and Relieve Acute Symptoms.  6. Likely consequences of symptoms if not treated: patient's ongoing symptoms are more than likely to get worse and experience a decreased daily in functioning and may require a higher level of care.  7. Client strengths include:  caring, creative, educated, empathetic, goal-focused, good listener, has a previous history of therapy, insightful, intelligent, motivated, open to learning, open to suggestions / feedback, and support of family, friends and providers .     Recommendations:     1. Plan for Safety and Risk Management:   Safety and Risk: A safety and risk management plan has been developed including: Patient consented to co-developed safety plan.  Safety and risk management plan was completed - see below.  Patient agreed to use safety plan should any safety concerns arise.  A copy was given to the patient..          Report to child / adult protection services was NA.     2. Patient's identified no thuan / Mandaeism / spiritual influences relevant to programming at this time.     3. Initial Treatment will focus on:   Depressed Mood -   Anxiety -   Functional Impairment at: work  Risk Management / Safety Concerns related to: Suicidal ideation  ADHD Testin. Resources/Service Plan:    services are not indicated.   Modifications to assist communication are not indicated.   Additional disability accommodations are not indicated.      5. Collaboration:   Collaboration / coordination of treatment will be initiated with the following  support professionals: Targeted Case Management (TCM).      6.  Referrals:   The following referral(s) will be initiated: Outpatient Mental Health Therapy Group  ADHD testing .       A Release of Information has been obtained for the following: Targeted Case Management (TCM).     Clinical  Substantiation/medical necessity for the above recommendations:    Patient is a 24-year-old  single male with no children with a history of ADHD associated with therapy, and psychiatry services. Patient is seeking help due to  Suicidal ideation, Depression .  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 He was not in therapy during that time, but he has restarted weekly therapy since termination. Pt reported a history of ADHD since he was [age], which he was on Concerta for about ten years. Pt reported struggling with depression and has had suicidal ideations since he was 13 or 14 years ago. He denied a history of suicide attempts and self-harm. Pt chronically feels bad about himself and struggles with shame. She stated that how others see pt versus how he views himself is the complete opposite. He reported feeling suicidal earlier today with various methods including hanging. He denied making a plan and having intent to act on these thoughts. 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 Patient has received mental health services in the past: therapy and psychiatry. Patient does not have a history of psychiatric hospitalizations. Patient does reports of SI in the past 2 months but denies any SA or SIB in the past. Patient denies a history of civil commitment.  Patient is receiving other mental health services.   Pt met with a new provider, a physician assistant, two weeks ago and was started on Lexapro and had follow appointment two days ago for medications evaluation. Pt has an established weekly OP therapist. Patient agrees with referral to IOP at [facility] and a hand of in basket was placed to navigators for services.      Patient endorses with the following symptoms: Change in sleep, Lack of interest,  Excessive or inappropriate guilt, Change in energy level, Difficulties concentrating, Change in appetite, Suicidal ideation, Feelings of hopelessness, Feelings of helplessness, Low self-worth, Ruminations, Feeling sad, down, or depressed, and Frequent crying, excessive guilt, thoughts of death/suicide, low self-esteem, impaired decision making, sadness, crying or feels like crying, negativistic, withdrawal/isolation, apathy. Excessive worry, Nervousness, Physical complaints, such as headaches, stomachaches, muscle tension, social anxiety, Fears/phobias being judged by other and chronically feels bad about himself and struggles with shame, Sleep disturbance, Ruminations, Poor concentration, and Irritability. Inattentive, Difficulties listening, Poor task completion, Poor organizational skills, Distractibility, and Forgetful.    The patient's acute suicide risk was determined to be moderate due to the following factors: admission current of suicidal thoughts with method, and history of suicidal behaviors in the past. Patient is not currently under the influence of alcohol or illicit substances, denies experiencing command hallucinations, and has no immediate access to firearms. The patient's acute risk could be higher if noncompliant with their follow-up appointments or using illicit substances or alcohol. Protective factors include strong bond to family unit, community support, or employment, responsibilities and duties to others, including pets and children, lives in a responsibly safe and stable environment, good treatment engagement, sense of importance of health and wellness, able to access care without barriers, good impulse control, help seeking, supportive ongoing medical and mental health care relationships, reality testing ability, sense of self-efficacy and/or positive self-esteem, optimistic outlook - identification of future goals.    7. LAWSON:    LAWSON:  Discussed the general effects of drugs and alcohol on  health and well-being. Provider gave patient printed information about the effects of chemical use on their health and well being. Recommendations:  none .     8. Records:   These were reviewed at time of assessment.   Information in this assessment was obtained from the medical record and  provided by patient who is a fair historian.    Patient will have open access to their mental health medical record.    9.   Interactive Complexity: No    10. Safety Plan:   Andres Safety Plan       Step 1: Warning signs:    Warning Signs    changes to sleep, appetite or mood, increased anger, agitation or irritability, feeling depressed or hopeless, spending more time alone or talking less, increased crying, decreased productivity, seeing or hearing things that aren't there, thoughts of not wanting to live anymore or of actually killing myself, thoughts of hurting others      Step 2: Internal coping strategies - Things I can do to take my mind off my problems without contacting another person:    Strategies    watching a favorite tv show or movie, listening to music I enjoy, going outside and breathing fresh air, going for a walk or exercising, taking a shower or bath, a cold or hot beverage, a healthy snack, drawing/coloring/painting, journaling, singing or dancing, deep breathing      Step 3: People and social settings that provide distraction:    Name Contact Information    Mom, girlfriend          Step 4: People whom I can ask for help during a crisis:    Name Contact Information    Therapist, Mom, girlfriend       Step 5: Professionals or agencies I can contact during a crisis:    Clinician/Agency Name Phone Emergency Contact    Therapist      [facility]  [phone]      [facility] Emergency Department Emergency Department Address Emergency Department Phone    [facility] [address] [phone]      Suicide Prevention Lifeline Phone: Call or Text 588  Crisis Text Line: Text HOME to  743109     Step 6: Making the environment safer (plan for lethal means safety):   Did not identify any lethal methods     Optional: What is most important to me and worth living for?:      Andres Safety Plan. Rosalina Serna and Zbigniew Barber. Used with permission of the authors.           Provider Name/ Credentials:  Quinn Kelley, BONNIEC,  [facility]   Phone: [phone]  Fax: [phone]     June 7, 2024

## 2024-06-07 NOTE — TELEPHONE ENCOUNTER
**This is a duplicate**    See other hand off on other telephone encounter in chart.    Thank you,    Sheela PAYNE  Patient Navigator

## 2024-06-07 NOTE — TELEPHONE ENCOUNTER
Summary of Patient Care Communication Handoff to Patient Navigator Coordinator    PATIENT'S NAME: Daniele Quinones  MRN:   [id number]  :   2000    DATE OF SERVICE: 24    Referral Needed: Yes    Is the patient coming from an inpatient unit? No    What program is this referral for? Adult Mental Health Referral    Level of Care Recommended:  Combined Intensive Outpatient Day Treatment Program (IOP-ADT) / Adult Day Treatment Program (ADT)    Specialty Track Recommendations:   Specialty Tracks: Young Adult    Schedule Preferences: Schedule Preference:  No schedule preference (Mornings or Afternoons)    Are there any potential barriers for entrance into programmatic care? NA    Followed up from  Needed?:  Yes: 9035 for ADHD testing    Mental Health Referral Needed: No    Release of Information Needed:  MELITA Cunha Needed: No    Follow up Requests:  Patient Navigator Coordinator Follow-Up Needed: Patient is undecided, Patient Navigator Coordinator [phone] was provided to the patient to call when decided. and Specialty Care Coordinator referral needed for:  9035 for ADHD testing    Comments: MELITA Kelley, [facility], LADC        Patient Navigator Coordinator Contact Information  Pool Message: [email or web address] ([phone])   Phone:  [phone]  Fax:  [phone]  Email:  [email or web address]

## 2024-06-07 NOTE — TELEPHONE ENCOUNTER
**Patient Navigator Follow Up*    6/7/2024;    Referral for young adult group and Specialty Care Coordinator referral needed for:  9035 for ADHD testing    Patient Navigator Coordinator Follow-Up Needed: Patient is undecided, Patient Navigator Coordinator [phone] was provided to the patient to call when decided. and Specialty Care Coordinator referral needed for:  9035 for ADHD testing       *Per above, patient is undecided about Programming and will call Navigation Hub when a decision is made.      **9035 order was placed and [facility] will call patient to schedule for the ADHD Testing Referral.      Thank you,    Sheela PAYNE  Patient Navigator